# Patient Record
Sex: FEMALE | Race: WHITE | NOT HISPANIC OR LATINO | Employment: FULL TIME | URBAN - NONMETROPOLITAN AREA
[De-identification: names, ages, dates, MRNs, and addresses within clinical notes are randomized per-mention and may not be internally consistent; named-entity substitution may affect disease eponyms.]

---

## 2021-03-01 LAB — HEP C HIM: NORMAL

## 2022-07-06 ENCOUNTER — OFFICE VISIT (OUTPATIENT)
Dept: FAMILY MEDICINE | Facility: OTHER | Age: 22
End: 2022-07-06
Attending: PHYSICIAN ASSISTANT
Payer: COMMERCIAL

## 2022-07-06 VITALS
OXYGEN SATURATION: 98 % | DIASTOLIC BLOOD PRESSURE: 62 MMHG | WEIGHT: 260.5 LBS | RESPIRATION RATE: 20 BRPM | HEIGHT: 70 IN | BODY MASS INDEX: 37.29 KG/M2 | TEMPERATURE: 96.2 F | HEART RATE: 85 BPM | SYSTOLIC BLOOD PRESSURE: 100 MMHG

## 2022-07-06 DIAGNOSIS — J01.00 ACUTE NON-RECURRENT MAXILLARY SINUSITIS: Primary | ICD-10-CM

## 2022-07-06 PROCEDURE — 99203 OFFICE O/P NEW LOW 30 MIN: CPT | Performed by: NURSE PRACTITIONER

## 2022-07-06 RX ORDER — HYDROXYZINE HYDROCHLORIDE 25 MG/1
25 TABLET, FILM COATED ORAL EVERY 6 HOURS PRN
COMMUNITY

## 2022-07-06 RX ORDER — DOXYCYCLINE 100 MG/1
100 CAPSULE ORAL 2 TIMES DAILY
Qty: 14 CAPSULE | Refills: 0 | Status: SHIPPED | OUTPATIENT
Start: 2022-07-06 | End: 2022-07-13

## 2022-07-06 RX ORDER — TRAZODONE HYDROCHLORIDE 50 MG/1
50 TABLET, FILM COATED ORAL AT BEDTIME
COMMUNITY

## 2022-07-06 ASSESSMENT — PAIN SCALES - GENERAL: PAINLEVEL: MILD PAIN (2)

## 2022-07-06 NOTE — NURSING NOTE
"Chief Complaint   Patient presents with     Sinus Problem     Pressure in face, headache, runny nose.  Hx of sinus infections.  Symptoms started last Thursday.  Denies fevers.  Neg COVID test.       Initial /62 (BP Location: Right arm, Patient Position: Sitting, Cuff Size: Adult Large)   Pulse 85   Temp (!) 96.2  F (35.7  C) (Tympanic)   Resp 20   Ht 1.778 m (5' 10\")   Wt 118.2 kg (260 lb 8 oz)   SpO2 98%   BMI 37.38 kg/m   Estimated body mass index is 37.38 kg/m  as calculated from the following:    Height as of this encounter: 1.778 m (5' 10\").    Weight as of this encounter: 118.2 kg (260 lb 8 oz).  Medication Reconciliation: complete        Isabela Lopez, RN  Patient presents to the clinic for      FOOD SECURITY SCREENING QUESTIONS  Hunger Vital Signs:  Within the past 12 months we worried whether our food would run out before we got money to buy more. Never  Within the past 12 months the food we bought just didn't last and we didn't have money to get more. Never    Advance Care Directive on file? no  Advance Care Directive provided to patient?  NA  "

## 2022-07-06 NOTE — PATIENT INSTRUCTIONS
We discussed symptoms / exam is consistent with sinus infection. However, symptoms ongoing x 7 days - would be reasonable to wait it out a few more days rather than starting antibiotics. Negative covid test reassuring.     Continue OTC medications.     Start doxycycline in 3 days if symptoms are not showing signs of improvement to avoid antibiotic resistance.

## 2022-07-06 NOTE — PROGRESS NOTES
ASSESSMENT/PLAN:    I have reviewed the nursing notes.  I have reviewed the findings, diagnosis, plan and need for follow up with the patient.    1. Acute non-recurrent maxillary sinusitis  Recommend waiting 3 more days and starting antibiotic only if symptoms are worsening or not improving at that time. Patient agreeable to this plan. Has been having recurrent sinusitis of maxillary sinuses- this is very consistent with that. Significant tenderness and erythema /swelling of the bilateral nares with green drainage observed.   augmentin has not been helpful ; may have some resistance. Discussed with patient that sinus infections are often caused by viral infections and do not meet criteria for antibiotic until symptoms are persistent with no signs of improvement for 10-14 days. She verbalizes understanding/ receptive to this information about antibiotic resistance and caution with prescribing.   - doxycycline hyclate (VIBRAMYCIN) 100 MG capsule; Take 1 capsule (100 mg) by mouth 2 times daily for 7 days  Dispense: 14 capsule; Refill: 0  -flonase  -netti pot / saline rinse recommended     Follow up if symptoms persist or worsen or concerns    I explained my diagnostic considerations and recommendations to the patient, who voiced understanding and agreement with the treatment plan. All questions were answered. We discussed potential side effects of any prescribed or recommended therapies, as well as expectations for response to treatments.    Chastity Teague NP  7/6/2022  3:49 PM    HPI:  Shantell Kiana Cintron is a 22 year old female who presents to [facility] today for concerns of pressure in bilateral face and teeth region (under eyes), headache, runny nose, hx of sinus infections. Symptoms started last Thursday. Denies fevers. Worsening/ no signs of improvement. Negative covid test on Friday July 1st at work in [place]. She tells me she gets sinus infections almost every other month . Augmentin x10 days did not  "help last time she needed more antibiotics. No known exposures . No one else has similar symptoms.     Augmentin many times in her history. Not working well anymore.     Strong desire for antibiotics today      PCP is home in [place], [place].     No past medical history on file.  No past surgical history on file.  Social History     Tobacco Use     Smoking status: Current Every Day Smoker     Types: Vaping Device     Smokeless tobacco: Never Used   Substance Use Topics     Alcohol use: Yes     Alcohol/week: 2.0 standard drinks     Types: 2 Cans of beer per week     Current Outpatient Medications   Medication Sig Dispense Refill     albuterol (PROAIR HFA, PROVENTIL HFA, VENTOLIN HFA) 108 (90 BASE) MCG/ACT inhaler Inhale 2 puffs into the lungs every 4 hours as needed       doxycycline hyclate (VIBRAMYCIN) 100 MG capsule Take 1 capsule (100 mg) by mouth 2 times daily for 7 days 14 capsule 0     FLUoxetine (PROZAC) 20 MG capsule Take 20 mg by mouth daily       hydrOXYzine (ATARAX) 25 MG tablet Take 25 mg by mouth every 6 hours as needed for itching (1-2 tablets as need for anxiety)       traZODone (DESYREL) 50 MG tablet Take 50 mg by mouth At Bedtime       Allergies   Allergen Reactions     Wasp Venom Protein Anaphylaxis     Bee Venom      Lactose      Other reaction(s): GI Upset     Past medical history, past surgical history, current medications and allergies reviewed and accurate to the best of my knowledge.      ROS:  Refer to HPI    /62 (BP Location: Right arm, Patient Position: Sitting, Cuff Size: Adult Large)   Pulse 85   Temp (!) 96.2  F (35.7  C) (Tympanic)   Resp 20   Ht 1.778 m (5' 10\")   Wt 118.2 kg (260 lb 8 oz)   SpO2 98%   Breastfeeding No   BMI 37.38 kg/m      EXAM:  General Appearance: Well appearing 22 year old female, appropriate appearance for age. No acute distress   Ears: Left TM intact, translucent with bony landmarks appreciated, no erythema, no effusion, no bulging, no purulence.  " Right TM intact, translucent with bony landmarks appreciated, no erythema, no effusion, no bulging, no purulence.  Left auditory canal clear.  Right auditory canal clear.  Normal external ears, non tender.  Eyes: conjunctivae normal without erythema or irritation, corneas clear, no drainage or crusting, no eyelid swelling, pupils equal   Oropharynx: moist mucous membranes, posterior pharynx without erythema, tonsils symmetric, no erythema, no exudates or petechiae, + post nasal drip seen, no trismus, voice clear.    Sinuses:  + sinus tenderness upon palpation of the bilateral maxillary sinuses  Nose:  Bilateral nares: + erythema, + edema, + drainage or congestion   Neck: supple without adenopathy  Respiratory: normal chest wall and respirations.  Normal effort.  Clear to auscultation bilaterally, no wheezing, crackles or rhonchi.  No increased work of breathing.  No cough appreciated.  Cardiac: RRR with no murmurs  Psychological: normal affect, alert, oriented, and pleasant.

## 2022-09-12 VITALS
HEART RATE: 72 BPM | RESPIRATION RATE: 16 BRPM | OXYGEN SATURATION: 98 % | SYSTOLIC BLOOD PRESSURE: 138 MMHG | TEMPERATURE: 97 F | WEIGHT: 260 LBS | DIASTOLIC BLOOD PRESSURE: 84 MMHG | HEIGHT: 70 IN | BODY MASS INDEX: 37.22 KG/M2

## 2022-09-12 LAB
ALBUMIN UR-MCNC: NEGATIVE MG/DL
APPEARANCE UR: CLEAR
BILIRUB UR QL STRIP: NEGATIVE
COLOR UR AUTO: ABNORMAL
GLUCOSE UR STRIP-MCNC: NEGATIVE MG/DL
HGB UR QL STRIP: NEGATIVE
HYALINE CASTS: 1 /LPF
KETONES UR STRIP-MCNC: NEGATIVE MG/DL
LEUKOCYTE ESTERASE UR QL STRIP: ABNORMAL
MUCOUS THREADS #/AREA URNS LPF: PRESENT /LPF
NITRATE UR QL: NEGATIVE
PH UR STRIP: 5.5 [PH] (ref 5–9)
RBC URINE: 2 /HPF
SP GR UR STRIP: 1.02 (ref 1–1.03)
SQUAMOUS EPITHELIAL: 3 /HPF
UROBILINOGEN UR STRIP-MCNC: NORMAL MG/DL
WBC URINE: 6 /HPF

## 2022-09-12 PROCEDURE — 99283 EMERGENCY DEPT VISIT LOW MDM: CPT | Performed by: FAMILY MEDICINE

## 2022-09-12 PROCEDURE — 81001 URINALYSIS AUTO W/SCOPE: CPT | Performed by: FAMILY MEDICINE

## 2022-09-13 ENCOUNTER — HOSPITAL ENCOUNTER (EMERGENCY)
Facility: OTHER | Age: 22
Discharge: HOME | End: 2022-09-13
Attending: FAMILY MEDICINE | Admitting: FAMILY MEDICINE
Payer: COMMERCIAL

## 2022-09-13 DIAGNOSIS — R31.0 GROSS HEMATURIA: Primary | ICD-10-CM

## 2022-09-13 PROCEDURE — 99283 EMERGENCY DEPT VISIT LOW MDM: CPT | Performed by: FAMILY MEDICINE

## 2022-09-13 RX ORDER — METOPROLOL SUCCINATE 25 MG/1
25 TABLET, EXTENDED RELEASE ORAL DAILY
Status: DISCONTINUED | OUTPATIENT
Start: 2022-09-13 | End: 2022-09-13 | Stop reason: HOSPADM

## 2022-09-13 NOTE — ED PROVIDER NOTES
"  History     Chief Complaint   Patient presents with     Hematuria     HPI  Shantell Cintron is a 22 year old female who had 1 episode of hematuria today.  Denies having her period currently states the blood definitely did not come from her vagina or rectum.  She is never had this in the past.  Denies dysuria or hematuria.  No trauma.  No fevers or chills.  No abdominal pain.  No flank pain.  She has had back pain on and off but she attributed that to her work.  She works at an assisted living facility.  Unfortunately, due to the busyness of the emergency room she had to wait over 5 hours to be seen.  By the time I was able to see her she did not want to be examined or have further work-up done.    Allergies:  Allergies   Allergen Reactions     Wasp Venom Protein Anaphylaxis     Bee Venom      Lactose      Other reaction(s): GI Upset       Problem List:    There are no problems to display for this patient.       Past Medical History:    No past medical history on file.    Past Surgical History:    No past surgical history on file.    Family History:    No family history on file.    Social History:  Marital Status:  Single [1]  Social History     Tobacco Use     Smoking status: Current Every Day Smoker     Types: Vaping Device     Smokeless tobacco: Never Used   Substance Use Topics     Alcohol use: Yes     Alcohol/week: 2.0 standard drinks     Types: 2 Cans of beer per week     Drug use: Never        Medications:    albuterol (PROAIR HFA, PROVENTIL HFA, VENTOLIN HFA) 108 (90 BASE) MCG/ACT inhaler  FLUoxetine (PROZAC) 20 MG capsule  hydrOXYzine (ATARAX) 25 MG tablet  traZODone (DESYREL) 50 MG tablet          Review of Systems    Physical Exam   BP: 138/84  Pulse: 72  Temp: 97  F (36.1  C)  Resp: 16  Height: 177.8 cm (5' 10\")  Weight: 117.9 kg (260 lb)  SpO2: 98 %      Physical Exam    ED Course              ED Course as of 09/13/22 0216   Tue Sep 13, 2022   0202 One episode of hematuria. Here with SO. "     Procedures              Critical Care time:  none               Results for orders placed or performed during the hospital encounter of 09/13/22 (from the past 24 hour(s))   UA reflex to Microscopic   Result Value Ref Range    Color Urine Light Yellow Colorless, Straw, Light Yellow, Yellow    Appearance Urine Clear Clear    Glucose Urine Negative Negative mg/dL    Bilirubin Urine Negative Negative    Ketones Urine Negative Negative mg/dL    Specific Gravity Urine 1.025 1.000 - 1.030    Blood Urine Negative Negative    pH Urine 5.5 5.0 - 9.0    Protein Albumin Urine Negative Negative mg/dL    Urobilinogen Urine Normal Normal, 2.0 mg/dL    Nitrite Urine Negative Negative    Leukocyte Esterase Urine Moderate (A) Negative    RBC Urine 2 <=2 /HPF    WBC Urine 6 (H) <=5 /HPF    Squamous Epithelials Urine 3 (H) <=1 /HPF    Mucus Urine Present (A) None Seen /LPF    Hyaline Casts Urine 1 <=2 /LPF       Medications   metoprolol succinate ER (TOPROL XL) 24 hr tablet 25 mg (has no administration in time range)       Assessments & Plan (with Medical Decision Making)     I have reviewed the nursing notes.    I have reviewed the findings, diagnosis, plan and need for follow up with the patient.  Discussed possibilities including urinary tract infection which I think is probably less likely.  Urinalysis appears more to be not a clean-catch rather than infection and she has no symptoms.  Patient only 1 her metoprolol refill that she recently relocated here and has not yet been able to establish care with a primary care provider.  She tells me she would rather wait to establish with a primary care provider to have her imaging and further work-up done for the hematuria.  She will be giving a list of local healthcare providers for follow-up.  She can return to the emergency room if she has any new or worsening symptoms.         New Prescriptions    No medications on file       Final diagnoses:   Gross hematuria       9/12/2022    [facility]     Misty Miguel DO  09/13/22 0210

## 2022-09-13 NOTE — ED TRIAGE NOTES
Pt is here today due to hematuria that she first noticed 1.5 hours ago.  Denies dysuria. She is c/o of slight lower back pain as well  Marian Phillips RN on 9/12/2022 at 9:56 PM       Triage Assessment     Row Name 09/12/22 8851       Triage Assessment (Adult)    Airway WDL WDL       Respiratory WDL    Respiratory WDL WDL       Skin Circulation/Temperature WDL    Skin Circulation/Temperature WDL WDL       Cardiac WDL    Cardiac WDL WDL       Peripheral/Neurovascular WDL    Peripheral Neurovascular WDL WDL       Cognitive/Neuro/Behavioral WDL    Cognitive/Neuro/Behavioral WDL WDL

## 2022-09-16 ENCOUNTER — NURSE TRIAGE (OUTPATIENT)
Dept: NURSING | Facility: CLINIC | Age: 22
End: 2022-09-16

## 2022-09-17 NOTE — TELEPHONE ENCOUNTER
Triage Call:    Caller: Patient  Patient was seen on Monday and a refill was to be sent in for a medication for her.  Reviewed ED note and no medication was described to being sent in note.  Advised patient to follow=up with PCP during the week or with ED.    She verbalized her understanding.          Patient verbalized understanding of instructions and questions answered.      Samaria Nair RN on 9/16/2022 at 9:06 PM        Reason for Disposition    Prescription request for new medicine (not a refill)    Additional Information    Negative: [1] Intentional drug overdose AND [2] suicidal thoughts or ideas    Negative: Drug overdose and triager unable to answer question    Negative: Caller requesting a renewal or refill of a medicine patient is currently taking    Negative: Caller requesting information unrelated to medicine    Negative: Caller requesting information about COVID-19 Vaccine    Negative: Caller requesting information about Emergency Contraception    Negative: Caller requesting information about Combined Birth Control Pills    Negative: Caller requesting information about Progestin Birth Control Pills    Negative: Caller requesting information about Post-Op pain or medicines    Negative: Caller requesting a prescription antibiotic (such as Penicillin) for Strep throat and has a positive culture result    Negative: Caller requesting a prescription anti-viral med (such as Tamiflu) and has influenza (flu) symptoms    Negative: Immunization reaction suspected    Negative: Rash while taking a medicine or within 3 days of stopping it    Negative: [1] Asthma and [2] having symptoms of asthma (cough, wheezing, etc.)    Negative: [1] Symptom of illness (e.g., headache, abdominal pain, earache, vomiting) AND [2] more than mild    Negative: Breastfeeding questions about mother's medicines and diet    Negative: MORE THAN A DOUBLE DOSE of a prescription or over-the-counter (OTC) drug    Negative: [1] DOUBLE DOSE  (an extra dose or lesser amount) of prescription drug AND [2] any symptoms (e.g., dizziness, nausea, pain, sleepiness)    Negative: [1] DOUBLE DOSE (an extra dose or lesser amount) of over-the-counter (OTC) drug AND [2] any symptoms (e.g., dizziness, nausea, pain, sleepiness)    Negative: Took another person's prescription drug    Negative: [1] DOUBLE DOSE (an extra dose or lesser amount) of prescription drug AND [2] NO symptoms (Exception: a double dose of antibiotics)    Negative: Diabetes drug error or overdose (e.g., took wrong type of insulin or took extra dose)    Negative: [1] Prescription not at pharmacy AND [2] was prescribed by PCP recently (Exception: triager has access to EMR and prescription is recorded there. Go to Home Care and confirm for pharmacy.)    Negative: [1] Pharmacy calling with prescription question AND [2] triager unable to answer question    Negative: [1] Caller has URGENT medicine question about med that PCP or specialist prescribed AND [2] triager unable to answer question    Negative: Medicine patch causing local rash or itching    Negative: [1] Caller has medicine question about med NOT prescribed by PCP AND [2] triager unable to answer question (e.g., compatibility with other med, storage)    Protocols used: MEDICATION QUESTION CALL-A-

## 2023-01-05 LAB
ALT SERPL-CCNC: 22 U/L
AST SERPL-CCNC: 26 U/L (ref 14–36)
CREATININE (EXTERNAL): 0.92 MG/DL (ref 0.52–1.04)
GFR ESTIMATED (EXTERNAL): 90 ML/MIN/1.73M2
GLUCOSE (EXTERNAL): 105 MG/DL (ref 75–100)
POTASSIUM (EXTERNAL): 4 MMOL/L (ref 3.5–5.1)

## 2023-01-21 ENCOUNTER — HEALTH MAINTENANCE LETTER (OUTPATIENT)
Age: 23
End: 2023-01-21

## 2023-03-30 ENCOUNTER — TRANSFERRED RECORDS (OUTPATIENT)
Dept: MULTI SPECIALTY CLINIC | Facility: CLINIC | Age: 23
End: 2023-03-30

## 2023-03-30 LAB — HIV 1&2 EXT: NORMAL

## 2024-04-28 ENCOUNTER — HEALTH MAINTENANCE LETTER (OUTPATIENT)
Age: 24
End: 2024-04-28

## 2025-05-11 ENCOUNTER — HEALTH MAINTENANCE LETTER (OUTPATIENT)
Age: 25
End: 2025-05-11